# Patient Record
Sex: MALE | Race: WHITE | NOT HISPANIC OR LATINO | URBAN - METROPOLITAN AREA
[De-identification: names, ages, dates, MRNs, and addresses within clinical notes are randomized per-mention and may not be internally consistent; named-entity substitution may affect disease eponyms.]

---

## 2023-09-29 ENCOUNTER — INPATIENT (INPATIENT)
Facility: HOSPITAL | Age: 53
LOS: 0 days | Discharge: OTHER INSTITUTION | DRG: 377 | End: 2023-09-30
Attending: INTERNAL MEDICINE | Admitting: INTERNAL MEDICINE
Payer: COMMERCIAL

## 2023-09-29 VITALS
WEIGHT: 214.95 LBS | TEMPERATURE: 98 F | HEART RATE: 95 BPM | OXYGEN SATURATION: 100 % | SYSTOLIC BLOOD PRESSURE: 132 MMHG | DIASTOLIC BLOOD PRESSURE: 79 MMHG | RESPIRATION RATE: 19 BRPM

## 2023-09-29 DIAGNOSIS — K92.2 GASTROINTESTINAL HEMORRHAGE, UNSPECIFIED: ICD-10-CM

## 2023-09-29 LAB
ALBUMIN SERPL ELPH-MCNC: 3.8 G/DL — SIGNIFICANT CHANGE UP (ref 3.3–5)
ALP SERPL-CCNC: 80 U/L — SIGNIFICANT CHANGE UP (ref 40–120)
ALT FLD-CCNC: 40 U/L — SIGNIFICANT CHANGE UP (ref 12–78)
ANION GAP SERPL CALC-SCNC: 14 MMOL/L — SIGNIFICANT CHANGE UP (ref 5–17)
APTT BLD: 52.4 SEC — HIGH (ref 24.5–35.6)
AST SERPL-CCNC: 38 U/L — HIGH (ref 15–37)
BASOPHILS # BLD AUTO: 0.08 K/UL — SIGNIFICANT CHANGE UP (ref 0–0.2)
BASOPHILS NFR BLD AUTO: 0.8 % — SIGNIFICANT CHANGE UP (ref 0–2)
BILIRUB SERPL-MCNC: 1.2 MG/DL — SIGNIFICANT CHANGE UP (ref 0.2–1.2)
BUN SERPL-MCNC: 53 MG/DL — HIGH (ref 7–23)
CALCIUM SERPL-MCNC: 10 MG/DL — SIGNIFICANT CHANGE UP (ref 8.5–10.1)
CHLORIDE SERPL-SCNC: 106 MMOL/L — SIGNIFICANT CHANGE UP (ref 96–108)
CO2 SERPL-SCNC: 20 MMOL/L — LOW (ref 22–31)
CREAT SERPL-MCNC: 2 MG/DL — HIGH (ref 0.5–1.3)
EGFR: 40 ML/MIN/1.73M2 — LOW
EOSINOPHIL # BLD AUTO: 0.24 K/UL — SIGNIFICANT CHANGE UP (ref 0–0.5)
EOSINOPHIL NFR BLD AUTO: 2.3 % — SIGNIFICANT CHANGE UP (ref 0–6)
FLUAV AG NPH QL: SIGNIFICANT CHANGE UP
FLUBV AG NPH QL: SIGNIFICANT CHANGE UP
GLUCOSE SERPL-MCNC: 134 MG/DL — HIGH (ref 70–99)
HCT VFR BLD CALC: 25.7 % — LOW (ref 39–50)
HGB BLD-MCNC: 8.3 G/DL — LOW (ref 13–17)
IMM GRANULOCYTES NFR BLD AUTO: 0.6 % — SIGNIFICANT CHANGE UP (ref 0–0.9)
INR BLD: 1.45 RATIO — HIGH (ref 0.85–1.18)
LIDOCAIN IGE QN: 247 U/L — HIGH (ref 13–75)
LYMPHOCYTES # BLD AUTO: 1.75 K/UL — SIGNIFICANT CHANGE UP (ref 1–3.3)
LYMPHOCYTES # BLD AUTO: 17.1 % — SIGNIFICANT CHANGE UP (ref 13–44)
MCHC RBC-ENTMCNC: 30.7 PG — SIGNIFICANT CHANGE UP (ref 27–34)
MCHC RBC-ENTMCNC: 32.3 GM/DL — SIGNIFICANT CHANGE UP (ref 32–36)
MCV RBC AUTO: 95.2 FL — SIGNIFICANT CHANGE UP (ref 80–100)
MONOCYTES # BLD AUTO: 0.31 K/UL — SIGNIFICANT CHANGE UP (ref 0–0.9)
MONOCYTES NFR BLD AUTO: 3 % — SIGNIFICANT CHANGE UP (ref 2–14)
NEUTROPHILS # BLD AUTO: 7.78 K/UL — HIGH (ref 1.8–7.4)
NEUTROPHILS NFR BLD AUTO: 76.2 % — SIGNIFICANT CHANGE UP (ref 43–77)
NRBC # BLD: 0 /100 WBCS — SIGNIFICANT CHANGE UP (ref 0–0)
OB PNL STL: POSITIVE
PLATELET # BLD AUTO: 124 K/UL — LOW (ref 150–400)
POTASSIUM SERPL-MCNC: 4.6 MMOL/L — SIGNIFICANT CHANGE UP (ref 3.5–5.3)
POTASSIUM SERPL-SCNC: 4.6 MMOL/L — SIGNIFICANT CHANGE UP (ref 3.5–5.3)
PROT SERPL-MCNC: 7.9 G/DL — SIGNIFICANT CHANGE UP (ref 6–8.3)
PROTHROM AB SERPL-ACNC: 16.8 SEC — HIGH (ref 9.5–13)
RBC # BLD: 2.7 M/UL — LOW (ref 4.2–5.8)
RBC # FLD: 15.9 % — HIGH (ref 10.3–14.5)
RSV RNA NPH QL NAA+NON-PROBE: SIGNIFICANT CHANGE UP
SARS-COV-2 RNA SPEC QL NAA+PROBE: SIGNIFICANT CHANGE UP
SODIUM SERPL-SCNC: 140 MMOL/L — SIGNIFICANT CHANGE UP (ref 135–145)
TROPONIN I, HIGH SENSITIVITY RESULT: 11.5 NG/L — SIGNIFICANT CHANGE UP
WBC # BLD: 10.22 K/UL — SIGNIFICANT CHANGE UP (ref 3.8–10.5)
WBC # FLD AUTO: 10.22 K/UL — SIGNIFICANT CHANGE UP (ref 3.8–10.5)

## 2023-09-29 PROCEDURE — 72125 CT NECK SPINE W/O DYE: CPT | Mod: 26,MA

## 2023-09-29 PROCEDURE — 71250 CT THORAX DX C-: CPT | Mod: 26,MA

## 2023-09-29 PROCEDURE — 93010 ELECTROCARDIOGRAM REPORT: CPT

## 2023-09-29 PROCEDURE — 99285 EMERGENCY DEPT VISIT HI MDM: CPT

## 2023-09-29 PROCEDURE — 74176 CT ABD & PELVIS W/O CONTRAST: CPT | Mod: 26,MA

## 2023-09-29 PROCEDURE — 70450 CT HEAD/BRAIN W/O DYE: CPT | Mod: 26,MA

## 2023-09-29 RX ORDER — OCTREOTIDE ACETATE 200 UG/ML
50 INJECTION, SOLUTION INTRAVENOUS; SUBCUTANEOUS
Qty: 500 | Refills: 0 | Status: DISCONTINUED | OUTPATIENT
Start: 2023-09-29 | End: 2023-09-30

## 2023-09-29 RX ORDER — PANTOPRAZOLE SODIUM 20 MG/1
8 TABLET, DELAYED RELEASE ORAL
Qty: 80 | Refills: 0 | Status: DISCONTINUED | OUTPATIENT
Start: 2023-09-29 | End: 2023-09-30

## 2023-09-29 RX ORDER — PANTOPRAZOLE SODIUM 20 MG/1
40 TABLET, DELAYED RELEASE ORAL ONCE
Refills: 0 | Status: COMPLETED | OUTPATIENT
Start: 2023-09-29 | End: 2023-09-29

## 2023-09-29 RX ORDER — CHLORHEXIDINE GLUCONATE 213 G/1000ML
1 SOLUTION TOPICAL
Refills: 0 | Status: DISCONTINUED | OUTPATIENT
Start: 2023-09-29 | End: 2023-09-30

## 2023-09-29 RX ORDER — OCTREOTIDE ACETATE 200 UG/ML
50 INJECTION, SOLUTION INTRAVENOUS; SUBCUTANEOUS ONCE
Refills: 0 | Status: COMPLETED | OUTPATIENT
Start: 2023-09-29 | End: 2023-09-29

## 2023-09-29 RX ADMIN — PANTOPRAZOLE SODIUM 40 MILLIGRAM(S): 20 TABLET, DELAYED RELEASE ORAL at 18:52

## 2023-09-29 RX ADMIN — OCTREOTIDE ACETATE 10 MICROGRAM(S)/HR: 200 INJECTION, SOLUTION INTRAVENOUS; SUBCUTANEOUS at 19:18

## 2023-09-29 RX ADMIN — OCTREOTIDE ACETATE 50 MICROGRAM(S): 200 INJECTION, SOLUTION INTRAVENOUS; SUBCUTANEOUS at 19:18

## 2023-09-29 NOTE — ED PROVIDER NOTE - OBJECTIVE STATEMENT
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  Patient states he was taking a nap stood up felt lightheaded dizzy fell hitting his face and upper chest on a dresser.  Patient states he did not pass out denies of any chest pain or shortness of breath.  Patient states he has noted dark stools since yesterday.  Patient was recently admitted for bacteremia and anemia at [facility].  Patient states also having intermittent nosebleeds. 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  Patient states he was taking a nap stood up felt lightheaded dizzy fell hitting his face and upper chest on a dresser.  Patient states he did not pass out denies of any chest pain or shortness of breath.  Patient states he has noted dark stools since yesterday.  Patient was recently admitted for bacteremia and anemia at [facility].  Patient states also having intermittent nosebleeds. 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  Patient states he was taking a nap stood up felt lightheaded dizzy fell hitting his face and upper chest on a dresser.  Patient states he did not pass out denies of any chest pain or shortness of breath.  Patient states he has noted dark stools since yesterday.  Patient was recently admitted for bacteremia and anemia at [facility].  Patient states also having intermittent nosebleeds.

## 2023-09-29 NOTE — CONSULT NOTE ADULT - SUBJECTIVE AND OBJECTIVE BOX
Patient is a 52y old  Male who presents with a chief complaint of     BRIEF HOSPITAL COURSE-  53 y/o Male with PMH of ETOH Abuse, Alcoholic Cirrhosis, Renal Vein Thrombosis Barretts Esophagus presents to [facility] s/p fall at home. Reports getting of from sitting down, when suddenly got very dizzy. Admits to trying to catch his balance however fell forward hitting his head. Denies LOC or head strike. Admits to associated intermittent black bowel movements. Describes as loose, gradually increasing in frequency. States he has had previous GIB bleeding in past, s/p multiple EGDs. Reports recent 20lb weight loss. Of note, patient recent diagnosis of Alcoholic cirrhosis with hepatorenal syndrome. Underwent extensive workup and accepted for Liver transplant.       On presentation to the ED, labs significant for anemia (H/H 8.3/25.7), elevated INR (1.45), ESTRELLITA (BUN/Cr 53/2.00).       Review of Systems:  CONSTITUTIONAL: +Fatigue. No fever or chills.  EYES: No eye pain, visual disturbances, or discharge.  ENMT: No difficulty hearing, tinnitus, vertigo. No sinus or throat pain.  NECK: No pain or stiffness.  RESPIRATORY: No cough, wheezing, chills or hemoptysis. No shortness of breath.  CARDIOVASCULAR: No chest pain, palpitations, dizziness, or leg swelling.  GASTROINTESTINAL: No abdominal or epigastric pain. No nausea, vomiting, or hematemesis. No diarrhea or constipation. No melena or hematochezia.  GENITOURINARY: No dysuria, frequency, hematuria, or incontinence.  NEUROLOGICAL: No headaches, memory loss, loss of strength, numbness, or tremors.  SKIN: No itching, burning, rashes, or lesions.   MUSCULOSKELETAL: No joint pain or swelling; No muscle, back, or extremity pain.  PSYCHIATRIC: No depression, anxiety, mood swings, or difficulty sleeping.      PAST MEDICAL & SURGICAL HISTORY:        Medications:  pantoprazole Infusion 8 mG/Hr IV Continuous <Continuous>    octreotide  Infusion 50 MICROgram(s)/Hr IV Continuous <Continuous>    chlorhexidine 2% Cloths 1 Application(s) Topical <User Schedule>            ICU Vital Signs Last 24 Hrs  T(C): 36.8 (29 Sep 2023 23:28), Max: 36.8 (29 Sep 2023 23:28)  T(F): 98.2 (29 Sep 2023 23:28), Max: 98.2 (29 Sep 2023 23:28)  HR: 90 (29 Sep 2023 23:28) (90 - 95)  BP: 122/68 (29 Sep 2023 23:28) (122/68 - 132/79)  BP(mean): --  ABP: --  ABP(mean): --  RR: 18 (29 Sep 2023 23:28) (18 - 19)  SpO2: 98% (29 Sep 2023 23:28) (98% - 100%)    O2 Parameters below as of 29 Sep 2023 23:28  Patient On (Oxygen Delivery Method): room air      I&O's Detail        LABS:                        8.3    10.22 )-----------( 124      ( 29 Sep 2023 17:50 )             25.7     09-29    140  |  106  |  53<H>  ----------------------------<  134<H>  4.6   |  20<L>  |  2.00<H>    Ca    10.0      29 Sep 2023 17:50    TPro  7.9  /  Alb  3.8  /  TBili  1.2  /  DBili  x   /  AST  38<H>  /  ALT  40  /  AlkPhos  80  09-29      CAPILLARY BLOOD GLUCOSE        PT/INR - ( 29 Sep 2023 17:50 )   PT: 16.8 sec;   INR: 1.45 ratio    PTT - ( 29 Sep 2023 17:50 )  PTT:52.4 sec      Urinalysis Basic - ( 29 Sep 2023 17:50 )  Color: x / Appearance: x / SG: x / pH: x  Gluc: 134 mg/dL / Ketone: x  / Bili: x / Urobili: x   Blood: x / Protein: x / Nitrite: x   Leuk Esterase: x / RBC: x / WBC x   Sq Epi: x / Non Sq Epi: x / Bacteria: x      CULTURES:      Physical Examination:  General: Middle aged male, +Laceration on L chin. In no acute distress.    HEENT: Pupils equal, reactive to light.  Symmetric.  PULM: Clear to auscultation bilaterally, no adventitious sounds. No significant sputum production.  NECK: Supple, no lymphadenopathy, trachea midline.  CVS: Regular rate and rhythm. No murmurs, rubs, or gallops. S1, S2 intact.   ABD: Soft, mildly distended, nontender, normoactive bowel sounds. No masses palpable.   EXT: No edema, nontender.  SKIN: Warm and well perfused. Multiple petechial hemorrhages.   NEURO: Alert, oriented, interactive, nonfocal.  DEVICES:         RADIOLOGY-    < from: CT Cervical Spine No Cont (09.29.23 @ 17:33) >  ACC: [id number] EXAM:  CT CERVICAL SPINE   ORDERED BY: BARBARA ALEXANDER     ACC: [id number] EXAM:  CT BRAIN   ORDERED BY: BARBARA ALEXANDER     PROCEDURE DATE:  09/29/2023      INTERPRETATION:  CLINICAL HISTORY: Trauma. Status post fall on Coumadin.    TECHNIQUE: A noncontrast head CT and a noncontrast cervical spine CT were   performed. The head CT was performed with contiguous 5 mm transaxial   images from the skull base to vertex. Images were reviewed in brain,   stroke, subdural and bone windows.  Thecervical spine CT was performed with transaxial thin section images   from the occiput to the T2 level.  Coronal and sagittal reformatted   images were created from the transaxial source data.  Images were   reviewed in bone and soft tissue windows.    COMPARISON: None available.    FINDINGS:    Head CT:  There is no acute intracranial hemorrhage, vasogenic edema or evidence   for acute large vascular territory infarct. There is significant   parenchymal attenuation abnormality.    The ventricles, sulci and cisternal spaces are unremarkable in size and   configuration for the patient's stated age.  There is no midline shift or   abnormal extra-axial fluid collection.    The calvarium is intact.  There are no osteoblastic or lytic calvarial or  skull base lesions. Mild mucosal thickening right maxillary sinus. The   remaining paranasal sinuses and mastoid air cells are clear.    Cervical spine CT:  There is straightening of the normal cervical lordosis which could be due   to positioning and/or muscle spasm. Slight retrolisthesis of C5 in   relation to C6 is likely on a degenerative basis. There are no acute   fractures or evidence for traumatic malalignment. The vertebral body   heights are maintained. Multilevel facet alignment is preserved. The   atlanto-dental interval is within normal limits and the craniocervical   junction is unremarkable. Benign-appearing lytic lucency in the left C4   facet and sclerotic in the right C4 as facet. No aggressive osteoblastic   lesion.    There is no evidence for prevertebral soft tissue swelling or epidural   hematoma.    Spinal canal appears congenitally narrowed. There are superimposed   degenerative changes including intervertebral disc space narrowing, disc   osteophyte complexes and facet arthropathy which contribute to the   overall degree of canal stenosis, notable and likely severe at C3/C4,   C5/C6 and C6/C7.    The visualized soft tissues of the neck have an unremarkable unenhanced   appearance. The lung apices are clear.    IMPRESSION:  Head CT: No acute intracranial hemorrhage, extra-axial collection or   calvarial fracture.    Cervical spine CT: No acute cervical spine fracture or evidence of   traumatic malalignment. Cervical spondylosis superimposed on a   congenitally narrowed spinal canal.    --- End of Report ---      SEN JUÁREZ MD; Attending Radiologist  This document has been electronically signed. Sep 29 2023  6:03PM    < end of copied text >      CRITICAL CARE TIME SPENT: ** minutes assessing presenting problems of acute illness, which pose high probability of life threatening deterioration or end organ damage/dysfunction, as well as medical decision making including initiating plan of care, reviewing data, reviewing radiologic exams, discussing with multidisciplinary team,  discussing goals of care with patient/family, and writing this note.  Non-inclusive of procedures performed,   Patient is a 52y old  Male who presents with a chief complaint of     BRIEF HOSPITAL COURSE-  51 y/o Male with PMH of ETOH Abuse, Alcoholic Cirrhosis, Renal Vein Thrombosis Barretts Esophagus presents to [facility] s/p fall at home. Reports getting of from sitting down, when suddenly got very dizzy. Admits to trying to catch his balance however fell forward hitting his head. Denies LOC or head strike. Admits to associated intermittent black bowel movements. Describes as loose, gradually increasing in frequency. States he has had previous GIB bleeding in past, s/p multiple EGDs. Reports recent 20lb weight loss. Of note, patient recent diagnosis of Alcoholic cirrhosis with hepatorenal syndrome. Underwent extensive workup and accepted for Liver transplant.       On presentation to the ED, labs significant for anemia (H/H 8.3/25.7), elevated INR (1.45), ESTRELLITA (BUN/Cr 53/2.00).       Review of Systems:  CONSTITUTIONAL: +Fatigue. No fever or chills.  EYES: No eye pain, visual disturbances, or discharge.  ENMT: No difficulty hearing, tinnitus, vertigo. No sinus or throat pain.  NECK: No pain or stiffness.  RESPIRATORY: No cough, wheezing, chills or hemoptysis. No shortness of breath.  CARDIOVASCULAR: No chest pain, palpitations, dizziness, or leg swelling.  GASTROINTESTINAL: No abdominal or epigastric pain. No nausea, vomiting, or hematemesis. No diarrhea or constipation. No melena or hematochezia.  GENITOURINARY: No dysuria, frequency, hematuria, or incontinence.  NEUROLOGICAL: No headaches, memory loss, loss of strength, numbness, or tremors.  SKIN: No itching, burning, rashes, or lesions.   MUSCULOSKELETAL: No joint pain or swelling; No muscle, back, or extremity pain.  PSYCHIATRIC: No depression, anxiety, mood swings, or difficulty sleeping.      PAST MEDICAL & SURGICAL HISTORY:        Medications:  pantoprazole Infusion 8 mG/Hr IV Continuous <Continuous>    octreotide  Infusion 50 MICROgram(s)/Hr IV Continuous <Continuous>    chlorhexidine 2% Cloths 1 Application(s) Topical <User Schedule>            ICU Vital Signs Last 24 Hrs  T(C): 36.8 (29 Sep 2023 23:28), Max: 36.8 (29 Sep 2023 23:28)  T(F): 98.2 (29 Sep 2023 23:28), Max: 98.2 (29 Sep 2023 23:28)  HR: 90 (29 Sep 2023 23:28) (90 - 95)  BP: 122/68 (29 Sep 2023 23:28) (122/68 - 132/79)  BP(mean): --  ABP: --  ABP(mean): --  RR: 18 (29 Sep 2023 23:28) (18 - 19)  SpO2: 98% (29 Sep 2023 23:28) (98% - 100%)    O2 Parameters below as of 29 Sep 2023 23:28  Patient On (Oxygen Delivery Method): room air      I&O's Detail        LABS:                        8.3    10.22 )-----------( 124      ( 29 Sep 2023 17:50 )             25.7     09-29    140  |  106  |  53<H>  ----------------------------<  134<H>  4.6   |  20<L>  |  2.00<H>    Ca    10.0      29 Sep 2023 17:50    TPro  7.9  /  Alb  3.8  /  TBili  1.2  /  DBili  x   /  AST  38<H>  /  ALT  40  /  AlkPhos  80  09-29      CAPILLARY BLOOD GLUCOSE        PT/INR - ( 29 Sep 2023 17:50 )   PT: 16.8 sec;   INR: 1.45 ratio    PTT - ( 29 Sep 2023 17:50 )  PTT:52.4 sec      Urinalysis Basic - ( 29 Sep 2023 17:50 )  Color: x / Appearance: x / SG: x / pH: x  Gluc: 134 mg/dL / Ketone: x  / Bili: x / Urobili: x   Blood: x / Protein: x / Nitrite: x   Leuk Esterase: x / RBC: x / WBC x   Sq Epi: x / Non Sq Epi: x / Bacteria: x      CULTURES:      Physical Examination:  General: Middle aged male, +Laceration on L chin. In no acute distress.    HEENT: Pupils equal, reactive to light.  Symmetric.  PULM: Clear to auscultation bilaterally, no adventitious sounds. No significant sputum production.  NECK: Supple, no lymphadenopathy, trachea midline.  CVS: Regular rate and rhythm. No murmurs, rubs, or gallops. S1, S2 intact.   ABD: Soft, mildly distended, nontender, normoactive bowel sounds. No masses palpable.   EXT: No edema, nontender.  SKIN: Warm and well perfused. Multiple petechial hemorrhages.   NEURO: Alert, oriented, interactive, nonfocal.  DEVICES:         RADIOLOGY-    < from: CT Cervical Spine No Cont (09.29.23 @ 17:33) >  ACC: [id number] EXAM:  CT CERVICAL SPINE   ORDERED BY: BARBARA ALEXANDER     ACC: [id number] EXAM:  CT BRAIN   ORDERED BY: BARBARA ALEXANDER     PROCEDURE DATE:  09/29/2023      INTERPRETATION:  CLINICAL HISTORY: Trauma. Status post fall on Coumadin.    TECHNIQUE: A noncontrast head CT and a noncontrast cervical spine CT were   performed. The head CT was performed with contiguous 5 mm transaxial   images from the skull base to vertex. Images were reviewed in brain,   stroke, subdural and bone windows.  Thecervical spine CT was performed with transaxial thin section images   from the occiput to the T2 level.  Coronal and sagittal reformatted   images were created from the transaxial source data.  Images were   reviewed in bone and soft tissue windows.    COMPARISON: None available.    FINDINGS:    Head CT:  There is no acute intracranial hemorrhage, vasogenic edema or evidence   for acute large vascular territory infarct. There is significant   parenchymal attenuation abnormality.    The ventricles, sulci and cisternal spaces are unremarkable in size and   configuration for the patient's stated age.  There is no midline shift or   abnormal extra-axial fluid collection.    The calvarium is intact.  There are no osteoblastic or lytic calvarial or  skull base lesions. Mild mucosal thickening right maxillary sinus. The   remaining paranasal sinuses and mastoid air cells are clear.    Cervical spine CT:  There is straightening of the normal cervical lordosis which could be due   to positioning and/or muscle spasm. Slight retrolisthesis of C5 in   relation to C6 is likely on a degenerative basis. There are no acute   fractures or evidence for traumatic malalignment. The vertebral body   heights are maintained. Multilevel facet alignment is preserved. The   atlanto-dental interval is within normal limits and the craniocervical   junction is unremarkable. Benign-appearing lytic lucency in the left C4   facet and sclerotic in the right C4 as facet. No aggressive osteoblastic   lesion.    There is no evidence for prevertebral soft tissue swelling or epidural   hematoma.    Spinal canal appears congenitally narrowed. There are superimposed   degenerative changes including intervertebral disc space narrowing, disc   osteophyte complexes and facet arthropathy which contribute to the   overall degree of canal stenosis, notable and likely severe at C3/C4,   C5/C6 and C6/C7.    The visualized soft tissues of the neck have an unremarkable unenhanced   appearance. The lung apices are clear.    IMPRESSION:  Head CT: No acute intracranial hemorrhage, extra-axial collection or   calvarial fracture.    Cervical spine CT: No acute cervical spine fracture or evidence of   traumatic malalignment. Cervical spondylosis superimposed on a   congenitally narrowed spinal canal.    --- End of Report ---      SEN JUÁREZ MD; Attending Radiologist  This document has been electronically signed. Sep 29 2023  6:03PM    < end of copied text >      CRITICAL CARE TIME SPENT: ** minutes assessing presenting problems of acute illness, which pose high probability of life threatening deterioration or end organ damage/dysfunction, as well as medical decision making including initiating plan of care, reviewing data, reviewing radiologic exams, discussing with multidisciplinary team,  discussing goals of care with patient/family, and writing this note.  Non-inclusive of procedures performed,   Patient is a 52y old  Male who presents with a chief complaint of     BRIEF HOSPITAL COURSE-  51 y/o Male with PMH of ETOH Abuse, Alcoholic Cirrhosis, Renal Vein Thrombosis Barretts Esophagus presents to [facility] s/p fall at home. Reports getting of from sitting down, when suddenly got very dizzy. Admits to trying to catch his balance however fell forward hitting his head. Denies LOC or head strike. Admits to associated intermittent black bowel movements. Describes as loose, gradually increasing in frequency. States he has had previous GIB bleeding in past, s/p multiple EGDs. Reports recent 20lb weight loss. Of note, patient recent diagnosis of Alcoholic cirrhosis with hepatorenal syndrome. Underwent extensive workup and accepted for Liver transplant.       On presentation to the ED, labs significant for anemia (H/H 8.3/25.7), elevated INR (1.45), ESTRELLITA (BUN/Cr 53/2.00).       Review of Systems:  CONSTITUTIONAL: +Fatigue. No fever or chills.  EYES: No eye pain, visual disturbances, or discharge.  ENMT: No difficulty hearing, tinnitus, vertigo. No sinus or throat pain.  NECK: No pain or stiffness.  RESPIRATORY: No cough, wheezing, chills or hemoptysis. No shortness of breath.  CARDIOVASCULAR: No chest pain, palpitations, dizziness, or leg swelling.  GASTROINTESTINAL: No abdominal or epigastric pain. No nausea, vomiting, or hematemesis. No diarrhea or constipation. No melena or hematochezia.  GENITOURINARY: No dysuria, frequency, hematuria, or incontinence.  NEUROLOGICAL: No headaches, memory loss, loss of strength, numbness, or tremors.  SKIN: No itching, burning, rashes, or lesions.   MUSCULOSKELETAL: No joint pain or swelling; No muscle, back, or extremity pain.  PSYCHIATRIC: No depression, anxiety, mood swings, or difficulty sleeping.      PAST MEDICAL & SURGICAL HISTORY:        Medications:  pantoprazole Infusion 8 mG/Hr IV Continuous <Continuous>    octreotide  Infusion 50 MICROgram(s)/Hr IV Continuous <Continuous>    chlorhexidine 2% Cloths 1 Application(s) Topical <User Schedule>            ICU Vital Signs Last 24 Hrs  T(C): 36.8 (29 Sep 2023 23:28), Max: 36.8 (29 Sep 2023 23:28)  T(F): 98.2 (29 Sep 2023 23:28), Max: 98.2 (29 Sep 2023 23:28)  HR: 90 (29 Sep 2023 23:28) (90 - 95)  BP: 122/68 (29 Sep 2023 23:28) (122/68 - 132/79)  BP(mean): --  ABP: --  ABP(mean): --  RR: 18 (29 Sep 2023 23:28) (18 - 19)  SpO2: 98% (29 Sep 2023 23:28) (98% - 100%)    O2 Parameters below as of 29 Sep 2023 23:28  Patient On (Oxygen Delivery Method): room air      I&O's Detail        LABS:                        8.3    10.22 )-----------( 124      ( 29 Sep 2023 17:50 )             25.7     09-29    140  |  106  |  53<H>  ----------------------------<  134<H>  4.6   |  20<L>  |  2.00<H>    Ca    10.0      29 Sep 2023 17:50    TPro  7.9  /  Alb  3.8  /  TBili  1.2  /  DBili  x   /  AST  38<H>  /  ALT  40  /  AlkPhos  80  09-29      CAPILLARY BLOOD GLUCOSE        PT/INR - ( 29 Sep 2023 17:50 )   PT: 16.8 sec;   INR: 1.45 ratio    PTT - ( 29 Sep 2023 17:50 )  PTT:52.4 sec      Urinalysis Basic - ( 29 Sep 2023 17:50 )  Color: x / Appearance: x / SG: x / pH: x  Gluc: 134 mg/dL / Ketone: x  / Bili: x / Urobili: x   Blood: x / Protein: x / Nitrite: x   Leuk Esterase: x / RBC: x / WBC x   Sq Epi: x / Non Sq Epi: x / Bacteria: x      CULTURES:      Physical Examination:  General: Middle aged male, +Laceration on L chin. In no acute distress.    HEENT: Pupils equal, reactive to light.  Symmetric.  PULM: Clear to auscultation bilaterally, no adventitious sounds. No significant sputum production.  NECK: Supple, no lymphadenopathy, trachea midline.  CVS: Regular rate and rhythm. No murmurs, rubs, or gallops. S1, S2 intact.   ABD: Soft, mildly distended, nontender, normoactive bowel sounds. No masses palpable.   EXT: No edema, nontender.  SKIN: Warm and well perfused. Multiple petechial hemorrhages.   NEURO: Alert, oriented, interactive, nonfocal.  DEVICES:         RADIOLOGY-    < from: CT Cervical Spine No Cont (09.29.23 @ 17:33) >  ACC: [id number] EXAM:  CT CERVICAL SPINE   ORDERED BY: BARBARA ALEXANDER     ACC: [id number] EXAM:  CT BRAIN   ORDERED BY: BARBARA ALEXANDER     PROCEDURE DATE:  09/29/2023      INTERPRETATION:  CLINICAL HISTORY: Trauma. Status post fall on Coumadin.    TECHNIQUE: A noncontrast head CT and a noncontrast cervical spine CT were   performed. The head CT was performed with contiguous 5 mm transaxial   images from the skull base to vertex. Images were reviewed in brain,   stroke, subdural and bone windows.  Thecervical spine CT was performed with transaxial thin section images   from the occiput to the T2 level.  Coronal and sagittal reformatted   images were created from the transaxial source data.  Images were   reviewed in bone and soft tissue windows.    COMPARISON: None available.    FINDINGS:    Head CT:  There is no acute intracranial hemorrhage, vasogenic edema or evidence   for acute large vascular territory infarct. There is significant   parenchymal attenuation abnormality.    The ventricles, sulci and cisternal spaces are unremarkable in size and   configuration for the patient's stated age.  There is no midline shift or   abnormal extra-axial fluid collection.    The calvarium is intact.  There are no osteoblastic or lytic calvarial or  skull base lesions. Mild mucosal thickening right maxillary sinus. The   remaining paranasal sinuses and mastoid air cells are clear.    Cervical spine CT:  There is straightening of the normal cervical lordosis which could be due   to positioning and/or muscle spasm. Slight retrolisthesis of C5 in   relation to C6 is likely on a degenerative basis. There are no acute   fractures or evidence for traumatic malalignment. The vertebral body   heights are maintained. Multilevel facet alignment is preserved. The   atlanto-dental interval is within normal limits and the craniocervical   junction is unremarkable. Benign-appearing lytic lucency in the left C4   facet and sclerotic in the right C4 as facet. No aggressive osteoblastic   lesion.    There is no evidence for prevertebral soft tissue swelling or epidural   hematoma.    Spinal canal appears congenitally narrowed. There are superimposed   degenerative changes including intervertebral disc space narrowing, disc   osteophyte complexes and facet arthropathy which contribute to the   overall degree of canal stenosis, notable and likely severe at C3/C4,   C5/C6 and C6/C7.    The visualized soft tissues of the neck have an unremarkable unenhanced   appearance. The lung apices are clear.    IMPRESSION:  Head CT: No acute intracranial hemorrhage, extra-axial collection or   calvarial fracture.    Cervical spine CT: No acute cervical spine fracture or evidence of   traumatic malalignment. Cervical spondylosis superimposed on a   congenitally narrowed spinal canal.    --- End of Report ---      SEN JUÁREZ MD; Attending Radiologist  This document has been electronically signed. Sep 29 2023  6:03PM    < end of copied text >      CRITICAL CARE TIME SPENT: ** minutes assessing presenting problems of acute illness, which pose high probability of life threatening deterioration or end organ damage/dysfunction, as well as medical decision making including initiating plan of care, reviewing data, reviewing radiologic exams, discussing with multidisciplinary team,  discussing goals of care with patient/family, and writing this note.  Non-inclusive of procedures performed,   Patient is a 52y old  Male who presents with a chief complaint of     BRIEF HOSPITAL COURSE-  53 y/o Male with PMH of ETOH Abuse, Alcoholic Cirrhosis, Renal Vein Thrombosis, Reyna's Esophagus presents to [facility] s/p fall at home. Reports getting of from sitting down, when suddenly got very dizzy. Admits to trying to catch his balance however fell forward hitting his head. Denies LOC or head strike. Admits to associated intermittent black bowel movements. Describes as loose, gradually increasing in frequency. States he has had previous GIB bleeding in past, s/p multiple EGDs. Reports recent 20lb weight loss. Of note, patient recent diagnosis of Alcoholic cirrhosis with hepatorenal syndrome. Underwent extensive workup and accepted for Liver transplant.       On presentation to the ED, labs significant for anemia (H/H 8.3/25.7), elevated INR (1.45), ESTRELLITA (BUN/Cr 53/2.00).       Review of Systems:  CONSTITUTIONAL: +Fatigue. No fever or chills.  EYES: No eye pain, visual disturbances, or discharge.  ENMT: No difficulty hearing, tinnitus, vertigo. No sinus or throat pain.  NECK: No pain or stiffness.  RESPIRATORY: No cough, wheezing, chills or hemoptysis. No shortness of breath.  CARDIOVASCULAR: No chest pain, palpitations, dizziness, or leg swelling.  GASTROINTESTINAL: No abdominal or epigastric pain. No nausea, vomiting, or hematemesis. No diarrhea or constipation. No melena or hematochezia.  GENITOURINARY: No dysuria, frequency, hematuria, or incontinence.  NEUROLOGICAL: No headaches, memory loss, loss of strength, numbness, or tremors.  SKIN: No itching, burning, rashes, or lesions.   MUSCULOSKELETAL: No joint pain or swelling; No muscle, back, or extremity pain.  PSYCHIATRIC: No depression, anxiety, mood swings, or difficulty sleeping.      PAST MEDICAL & SURGICAL HISTORY:        Medications:  pantoprazole Infusion 8 mG/Hr IV Continuous <Continuous>    octreotide  Infusion 50 MICROgram(s)/Hr IV Continuous <Continuous>    chlorhexidine 2% Cloths 1 Application(s) Topical <User Schedule>            ICU Vital Signs Last 24 Hrs  T(C): 36.8 (29 Sep 2023 23:28), Max: 36.8 (29 Sep 2023 23:28)  T(F): 98.2 (29 Sep 2023 23:28), Max: 98.2 (29 Sep 2023 23:28)  HR: 90 (29 Sep 2023 23:28) (90 - 95)  BP: 122/68 (29 Sep 2023 23:28) (122/68 - 132/79)  BP(mean): --  ABP: --  ABP(mean): --  RR: 18 (29 Sep 2023 23:28) (18 - 19)  SpO2: 98% (29 Sep 2023 23:28) (98% - 100%)    O2 Parameters below as of 29 Sep 2023 23:28  Patient On (Oxygen Delivery Method): room air      I&O's Detail        LABS:                        8.3    10.22 )-----------( 124      ( 29 Sep 2023 17:50 )             25.7     09-29    140  |  106  |  53<H>  ----------------------------<  134<H>  4.6   |  20<L>  |  2.00<H>    Ca    10.0      29 Sep 2023 17:50    TPro  7.9  /  Alb  3.8  /  TBili  1.2  /  DBili  x   /  AST  38<H>  /  ALT  40  /  AlkPhos  80  09-29      CAPILLARY BLOOD GLUCOSE        PT/INR - ( 29 Sep 2023 17:50 )   PT: 16.8 sec;   INR: 1.45 ratio    PTT - ( 29 Sep 2023 17:50 )  PTT:52.4 sec      Urinalysis Basic - ( 29 Sep 2023 17:50 )  Color: x / Appearance: x / SG: x / pH: x  Gluc: 134 mg/dL / Ketone: x  / Bili: x / Urobili: x   Blood: x / Protein: x / Nitrite: x   Leuk Esterase: x / RBC: x / WBC x   Sq Epi: x / Non Sq Epi: x / Bacteria: x      CULTURES:      Physical Examination:  General: Middle aged male, +Laceration on L chin. In no acute distress.    HEENT: Pupils equal, reactive to light.  Symmetric.  PULM: Clear to auscultation bilaterally, no adventitious sounds. No significant sputum production.  NECK: Supple, no lymphadenopathy, trachea midline.  CVS: Regular rate and rhythm. No murmurs, rubs, or gallops. S1, S2 intact.   ABD: Soft, mildly distended, nontender, normoactive bowel sounds. No masses palpable.   EXT: No edema, nontender.  SKIN: Warm and well perfused. Multiple petechial hemorrhages.   NEURO: Alert, oriented, interactive, nonfocal.  DEVICES:         RADIOLOGY-    < from: CT Cervical Spine No Cont (09.29.23 @ 17:33) >  ACC: [id number] EXAM:  CT CERVICAL SPINE   ORDERED BY: BARBARA ALEXANDER     ACC: [id number] EXAM:  CT BRAIN   ORDERED BY: BARBARA ALEXANDER     PROCEDURE DATE:  09/29/2023      INTERPRETATION:  CLINICAL HISTORY: Trauma. Status post fall on Coumadin.    TECHNIQUE: A noncontrast head CT and a noncontrast cervical spine CT were   performed. The head CT was performed with contiguous 5 mm transaxial   images from the skull base to vertex. Images were reviewed in brain,   stroke, subdural and bone windows.  Thecervical spine CT was performed with transaxial thin section images   from the occiput to the T2 level.  Coronal and sagittal reformatted   images were created from the transaxial source data.  Images were   reviewed in bone and soft tissue windows.    COMPARISON: None available.    FINDINGS:    Head CT:  There is no acute intracranial hemorrhage, vasogenic edema or evidence   for acute large vascular territory infarct. There is significant   parenchymal attenuation abnormality.    The ventricles, sulci and cisternal spaces are unremarkable in size and   configuration for the patient's stated age.  There is no midline shift or   abnormal extra-axial fluid collection.    The calvarium is intact.  There are no osteoblastic or lytic calvarial or  skull base lesions. Mild mucosal thickening right maxillary sinus. The   remaining paranasal sinuses and mastoid air cells are clear.    Cervical spine CT:  There is straightening of the normal cervical lordosis which could be due   to positioning and/or muscle spasm. Slight retrolisthesis of C5 in   relation to C6 is likely on a degenerative basis. There are no acute   fractures or evidence for traumatic malalignment. The vertebral body   heights are maintained. Multilevel facet alignment is preserved. The   atlanto-dental interval is within normal limits and the craniocervical   junction is unremarkable. Benign-appearing lytic lucency in the left C4   facet and sclerotic in the right C4 as facet. No aggressive osteoblastic   lesion.    There is no evidence for prevertebral soft tissue swelling or epidural   hematoma.    Spinal canal appears congenitally narrowed. There are superimposed   degenerative changes including intervertebral disc space narrowing, disc   osteophyte complexes and facet arthropathy which contribute to the   overall degree of canal stenosis, notable and likely severe at C3/C4,   C5/C6 and C6/C7.    The visualized soft tissues of the neck have an unremarkable unenhanced   appearance. The lung apices are clear.    IMPRESSION:  Head CT: No acute intracranial hemorrhage, extra-axial collection or   calvarial fracture.    Cervical spine CT: No acute cervical spine fracture or evidence of   traumatic malalignment. Cervical spondylosis superimposed on a   congenitally narrowed spinal canal.    --- End of Report ---      SEN JUÁREZ MD; Attending Radiologist  This document has been electronically signed. Sep 29 2023  6:03PM    < end of copied text >        Time spent on this patient encounter, which includes documenting this note in the electronic medical record, was 75+ minutes including assessing the presenting problems with associated risks, reviewing the medical record to prepare for the encounter, and meeting face to face with the patient to obtain additional history.  I have also performed an appropriate physical exam, made interventions listed and ordered and interpreted appropriate diagnostic studies as documented.     To improve communication and patient safety, I have coordinated care with the multidisciplinary team including the bedside nurse, appropriate attending of record and consultants as needed.     Patient is a 52y old  Male who presents with a chief complaint of     BRIEF HOSPITAL COURSE-  51 y/o Male with PMH of ETOH Abuse, Alcoholic Cirrhosis, Renal Vein Thrombosis, Reyna's Esophagus presents to [facility] s/p fall at home. Reports getting of from sitting down, when suddenly got very dizzy. Admits to trying to catch his balance however fell forward hitting his head. Denies LOC or head strike. Admits to associated intermittent black bowel movements. Describes as loose, gradually increasing in frequency. States he has had previous GIB bleeding in past, s/p multiple EGDs. Reports recent 20lb weight loss. Of note, patient recent diagnosis of Alcoholic cirrhosis with hepatorenal syndrome. Underwent extensive workup and accepted for Liver transplant.       On presentation to the ED, labs significant for anemia (H/H 8.3/25.7), elevated INR (1.45), ESTRELLITA (BUN/Cr 53/2.00).       Review of Systems:  CONSTITUTIONAL: +Fatigue. No fever or chills.  EYES: No eye pain, visual disturbances, or discharge.  ENMT: No difficulty hearing, tinnitus, vertigo. No sinus or throat pain.  NECK: No pain or stiffness.  RESPIRATORY: No cough, wheezing, chills or hemoptysis. No shortness of breath.  CARDIOVASCULAR: No chest pain, palpitations, dizziness, or leg swelling.  GASTROINTESTINAL: No abdominal or epigastric pain. No nausea, vomiting, or hematemesis. No diarrhea or constipation. No melena or hematochezia.  GENITOURINARY: No dysuria, frequency, hematuria, or incontinence.  NEUROLOGICAL: No headaches, memory loss, loss of strength, numbness, or tremors.  SKIN: No itching, burning, rashes, or lesions.   MUSCULOSKELETAL: No joint pain or swelling; No muscle, back, or extremity pain.  PSYCHIATRIC: No depression, anxiety, mood swings, or difficulty sleeping.      PAST MEDICAL & SURGICAL HISTORY:        Medications:  pantoprazole Infusion 8 mG/Hr IV Continuous <Continuous>    octreotide  Infusion 50 MICROgram(s)/Hr IV Continuous <Continuous>    chlorhexidine 2% Cloths 1 Application(s) Topical <User Schedule>            ICU Vital Signs Last 24 Hrs  T(C): 36.8 (29 Sep 2023 23:28), Max: 36.8 (29 Sep 2023 23:28)  T(F): 98.2 (29 Sep 2023 23:28), Max: 98.2 (29 Sep 2023 23:28)  HR: 90 (29 Sep 2023 23:28) (90 - 95)  BP: 122/68 (29 Sep 2023 23:28) (122/68 - 132/79)  BP(mean): --  ABP: --  ABP(mean): --  RR: 18 (29 Sep 2023 23:28) (18 - 19)  SpO2: 98% (29 Sep 2023 23:28) (98% - 100%)    O2 Parameters below as of 29 Sep 2023 23:28  Patient On (Oxygen Delivery Method): room air      I&O's Detail        LABS:                        8.3    10.22 )-----------( 124      ( 29 Sep 2023 17:50 )             25.7     09-29    140  |  106  |  53<H>  ----------------------------<  134<H>  4.6   |  20<L>  |  2.00<H>    Ca    10.0      29 Sep 2023 17:50    TPro  7.9  /  Alb  3.8  /  TBili  1.2  /  DBili  x   /  AST  38<H>  /  ALT  40  /  AlkPhos  80  09-29      CAPILLARY BLOOD GLUCOSE        PT/INR - ( 29 Sep 2023 17:50 )   PT: 16.8 sec;   INR: 1.45 ratio    PTT - ( 29 Sep 2023 17:50 )  PTT:52.4 sec      Urinalysis Basic - ( 29 Sep 2023 17:50 )  Color: x / Appearance: x / SG: x / pH: x  Gluc: 134 mg/dL / Ketone: x  / Bili: x / Urobili: x   Blood: x / Protein: x / Nitrite: x   Leuk Esterase: x / RBC: x / WBC x   Sq Epi: x / Non Sq Epi: x / Bacteria: x      CULTURES:      Physical Examination:  General: Middle aged male, +Laceration on L chin. In no acute distress.    HEENT: Pupils equal, reactive to light.  Symmetric.  PULM: Clear to auscultation bilaterally, no adventitious sounds. No significant sputum production.  NECK: Supple, no lymphadenopathy, trachea midline.  CVS: Regular rate and rhythm. No murmurs, rubs, or gallops. S1, S2 intact.   ABD: Soft, mildly distended, nontender, normoactive bowel sounds. No masses palpable.   EXT: No edema, nontender.  SKIN: Warm and well perfused. Multiple petechial hemorrhages.   NEURO: Alert, oriented, interactive, nonfocal.  DEVICES:         RADIOLOGY-    < from: CT Cervical Spine No Cont (09.29.23 @ 17:33) >  ACC: [id number] EXAM:  CT CERVICAL SPINE   ORDERED BY: BARBARA ALEXANDER     ACC: [id number] EXAM:  CT BRAIN   ORDERED BY: BARBARA ALEXANDER     PROCEDURE DATE:  09/29/2023      INTERPRETATION:  CLINICAL HISTORY: Trauma. Status post fall on Coumadin.    TECHNIQUE: A noncontrast head CT and a noncontrast cervical spine CT were   performed. The head CT was performed with contiguous 5 mm transaxial   images from the skull base to vertex. Images were reviewed in brain,   stroke, subdural and bone windows.  Thecervical spine CT was performed with transaxial thin section images   from the occiput to the T2 level.  Coronal and sagittal reformatted   images were created from the transaxial source data.  Images were   reviewed in bone and soft tissue windows.    COMPARISON: None available.    FINDINGS:    Head CT:  There is no acute intracranial hemorrhage, vasogenic edema or evidence   for acute large vascular territory infarct. There is significant   parenchymal attenuation abnormality.    The ventricles, sulci and cisternal spaces are unremarkable in size and   configuration for the patient's stated age.  There is no midline shift or   abnormal extra-axial fluid collection.    The calvarium is intact.  There are no osteoblastic or lytic calvarial or  skull base lesions. Mild mucosal thickening right maxillary sinus. The   remaining paranasal sinuses and mastoid air cells are clear.    Cervical spine CT:  There is straightening of the normal cervical lordosis which could be due   to positioning and/or muscle spasm. Slight retrolisthesis of C5 in   relation to C6 is likely on a degenerative basis. There are no acute   fractures or evidence for traumatic malalignment. The vertebral body   heights are maintained. Multilevel facet alignment is preserved. The   atlanto-dental interval is within normal limits and the craniocervical   junction is unremarkable. Benign-appearing lytic lucency in the left C4   facet and sclerotic in the right C4 as facet. No aggressive osteoblastic   lesion.    There is no evidence for prevertebral soft tissue swelling or epidural   hematoma.    Spinal canal appears congenitally narrowed. There are superimposed   degenerative changes including intervertebral disc space narrowing, disc   osteophyte complexes and facet arthropathy which contribute to the   overall degree of canal stenosis, notable and likely severe at C3/C4,   C5/C6 and C6/C7.    The visualized soft tissues of the neck have an unremarkable unenhanced   appearance. The lung apices are clear.    IMPRESSION:  Head CT: No acute intracranial hemorrhage, extra-axial collection or   calvarial fracture.    Cervical spine CT: No acute cervical spine fracture or evidence of   traumatic malalignment. Cervical spondylosis superimposed on a   congenitally narrowed spinal canal.    --- End of Report ---      SEN JUÁREZ MD; Attending Radiologist  This document has been electronically signed. Sep 29 2023  6:03PM    < end of copied text >        Time spent on this patient encounter, which includes documenting this note in the electronic medical record, was 75+ minutes including assessing the presenting problems with associated risks, reviewing the medical record to prepare for the encounter, and meeting face to face with the patient to obtain additional history.  I have also performed an appropriate physical exam, made interventions listed and ordered and interpreted appropriate diagnostic studies as documented.     To improve communication and patient safety, I have coordinated care with the multidisciplinary team including the bedside nurse, appropriate attending of record and consultants as needed.     Patient is a 52y old  Male who presents with a chief complaint of     BRIEF HOSPITAL COURSE-  51 y/o Male with PMH of ETOH Abuse, Alcoholic Cirrhosis, Renal Vein Thrombosis, Reyna's Esophagus presents to [facility] s/p fall at home. Reports getting of from sitting down, when suddenly got very dizzy. Admits to trying to catch his balance however fell forward hitting his head. Denies LOC or head strike. Admits to associated intermittent black bowel movements. Describes as loose, gradually increasing in frequency. States he has had previous GIB bleeding in past, s/p multiple EGDs. Reports recent 20lb weight loss. Of note, patient recent diagnosis of Alcoholic cirrhosis with hepatorenal syndrome. Underwent extensive workup and accepted for Liver transplant.       On presentation to the ED, labs significant for anemia (H/H 8.3/25.7), elevated INR (1.45), ESTRELLITA (BUN/Cr 53/2.00).       Review of Systems:  CONSTITUTIONAL: +Fatigue. No fever or chills.  EYES: No eye pain, visual disturbances, or discharge.  ENMT: No difficulty hearing, tinnitus, vertigo. No sinus or throat pain.  NECK: No pain or stiffness.  RESPIRATORY: No cough, wheezing, chills or hemoptysis. No shortness of breath.  CARDIOVASCULAR: No chest pain, palpitations, dizziness, or leg swelling.  GASTROINTESTINAL: No abdominal or epigastric pain. No nausea, vomiting, or hematemesis. No diarrhea or constipation. No melena or hematochezia.  GENITOURINARY: No dysuria, frequency, hematuria, or incontinence.  NEUROLOGICAL: No headaches, memory loss, loss of strength, numbness, or tremors.  SKIN: No itching, burning, rashes, or lesions.   MUSCULOSKELETAL: No joint pain or swelling; No muscle, back, or extremity pain.  PSYCHIATRIC: No depression, anxiety, mood swings, or difficulty sleeping.      PAST MEDICAL & SURGICAL HISTORY:        Medications:  pantoprazole Infusion 8 mG/Hr IV Continuous <Continuous>    octreotide  Infusion 50 MICROgram(s)/Hr IV Continuous <Continuous>    chlorhexidine 2% Cloths 1 Application(s) Topical <User Schedule>            ICU Vital Signs Last 24 Hrs  T(C): 36.8 (29 Sep 2023 23:28), Max: 36.8 (29 Sep 2023 23:28)  T(F): 98.2 (29 Sep 2023 23:28), Max: 98.2 (29 Sep 2023 23:28)  HR: 90 (29 Sep 2023 23:28) (90 - 95)  BP: 122/68 (29 Sep 2023 23:28) (122/68 - 132/79)  BP(mean): --  ABP: --  ABP(mean): --  RR: 18 (29 Sep 2023 23:28) (18 - 19)  SpO2: 98% (29 Sep 2023 23:28) (98% - 100%)    O2 Parameters below as of 29 Sep 2023 23:28  Patient On (Oxygen Delivery Method): room air      I&O's Detail        LABS:                        8.3    10.22 )-----------( 124      ( 29 Sep 2023 17:50 )             25.7     09-29    140  |  106  |  53<H>  ----------------------------<  134<H>  4.6   |  20<L>  |  2.00<H>    Ca    10.0      29 Sep 2023 17:50    TPro  7.9  /  Alb  3.8  /  TBili  1.2  /  DBili  x   /  AST  38<H>  /  ALT  40  /  AlkPhos  80  09-29      CAPILLARY BLOOD GLUCOSE        PT/INR - ( 29 Sep 2023 17:50 )   PT: 16.8 sec;   INR: 1.45 ratio    PTT - ( 29 Sep 2023 17:50 )  PTT:52.4 sec      Urinalysis Basic - ( 29 Sep 2023 17:50 )  Color: x / Appearance: x / SG: x / pH: x  Gluc: 134 mg/dL / Ketone: x  / Bili: x / Urobili: x   Blood: x / Protein: x / Nitrite: x   Leuk Esterase: x / RBC: x / WBC x   Sq Epi: x / Non Sq Epi: x / Bacteria: x      CULTURES:      Physical Examination:  General: Middle aged male, +Laceration on L chin. In no acute distress.    HEENT: Pupils equal, reactive to light.  Symmetric.  PULM: Clear to auscultation bilaterally, no adventitious sounds. No significant sputum production.  NECK: Supple, no lymphadenopathy, trachea midline.  CVS: Regular rate and rhythm. No murmurs, rubs, or gallops. S1, S2 intact.   ABD: Soft, mildly distended, nontender, normoactive bowel sounds. No masses palpable.   EXT: No edema, nontender.  SKIN: Warm and well perfused. Multiple petechial hemorrhages.   NEURO: Alert, oriented, interactive, nonfocal.  DEVICES:         RADIOLOGY-    < from: CT Cervical Spine No Cont (09.29.23 @ 17:33) >  ACC: [id number] EXAM:  CT CERVICAL SPINE   ORDERED BY: BARBARA ALEXANDER     ACC: [id number] EXAM:  CT BRAIN   ORDERED BY: BARBARA ALEXANDER     PROCEDURE DATE:  09/29/2023      INTERPRETATION:  CLINICAL HISTORY: Trauma. Status post fall on Coumadin.    TECHNIQUE: A noncontrast head CT and a noncontrast cervical spine CT were   performed. The head CT was performed with contiguous 5 mm transaxial   images from the skull base to vertex. Images were reviewed in brain,   stroke, subdural and bone windows.  Thecervical spine CT was performed with transaxial thin section images   from the occiput to the T2 level.  Coronal and sagittal reformatted   images were created from the transaxial source data.  Images were   reviewed in bone and soft tissue windows.    COMPARISON: None available.    FINDINGS:    Head CT:  There is no acute intracranial hemorrhage, vasogenic edema or evidence   for acute large vascular territory infarct. There is significant   parenchymal attenuation abnormality.    The ventricles, sulci and cisternal spaces are unremarkable in size and   configuration for the patient's stated age.  There is no midline shift or   abnormal extra-axial fluid collection.    The calvarium is intact.  There are no osteoblastic or lytic calvarial or  skull base lesions. Mild mucosal thickening right maxillary sinus. The   remaining paranasal sinuses and mastoid air cells are clear.    Cervical spine CT:  There is straightening of the normal cervical lordosis which could be due   to positioning and/or muscle spasm. Slight retrolisthesis of C5 in   relation to C6 is likely on a degenerative basis. There are no acute   fractures or evidence for traumatic malalignment. The vertebral body   heights are maintained. Multilevel facet alignment is preserved. The   atlanto-dental interval is within normal limits and the craniocervical   junction is unremarkable. Benign-appearing lytic lucency in the left C4   facet and sclerotic in the right C4 as facet. No aggressive osteoblastic   lesion.    There is no evidence for prevertebral soft tissue swelling or epidural   hematoma.    Spinal canal appears congenitally narrowed. There are superimposed   degenerative changes including intervertebral disc space narrowing, disc   osteophyte complexes and facet arthropathy which contribute to the   overall degree of canal stenosis, notable and likely severe at C3/C4,   C5/C6 and C6/C7.    The visualized soft tissues of the neck have an unremarkable unenhanced   appearance. The lung apices are clear.    IMPRESSION:  Head CT: No acute intracranial hemorrhage, extra-axial collection or   calvarial fracture.    Cervical spine CT: No acute cervical spine fracture or evidence of   traumatic malalignment. Cervical spondylosis superimposed on a   congenitally narrowed spinal canal.    --- End of Report ---      SEN JUÁREZ MD; Attending Radiologist  This document has been electronically signed. Sep 29 2023  6:03PM    < end of copied text >        Time spent on this patient encounter, which includes documenting this note in the electronic medical record, was 75+ minutes including assessing the presenting problems with associated risks, reviewing the medical record to prepare for the encounter, and meeting face to face with the patient to obtain additional history.  I have also performed an appropriate physical exam, made interventions listed and ordered and interpreted appropriate diagnostic studies as documented.     To improve communication and patient safety, I have coordinated care with the multidisciplinary team including the bedside nurse, appropriate attending of record and consultants as needed.

## 2023-09-29 NOTE — ED PROVIDER NOTE - CLINICAL SUMMARY MEDICAL DECISION MAKING FREE TEXT BOX
52-year-old male with a history of chronic liver failure due to EtOH, portal venous thrombosis, multiple medical issues presents with Near syncopal episode today, fell to the ground.  Patient hit his face and his chest in his garage.  Patient complains of mild headache.  Some chest wall pain.  No other chest pain or shortness of breath.  No acute abdominal pain or abdominal trauma.  No neck pain or stiffness.  No photophobia.  No loss of consciousness with the fall.  Patient has been otherwise feeling in his usual state of health.  No cough/URI.  No other acute complaints.  Exam: Nontoxic, well-appearing.  Positive abrasions to the face, nasal abrasion.  Left anterior upper chest wall abrasion with no bony tenderness or crepitus.  CTA BL, no W/R/R.  Normal cardiac exam.  Abdomen soft, nontender, nondistended.  Normal nonfocal neurologic exam.  Full range of motion bilateral extremities x4 without pain.  No other acute findings on exam.  Acute near syncopal event today with a fall, head and chest trauma.  Patient on anticoagulation.  Will check labs, CT chest abdomen pelvis, CT head and neck, IV, reeval

## 2023-09-29 NOTE — ED PROVIDER NOTE - PROGRESS NOTE DETAILS
[facility] hepatology paged awaiting call back   pt had another dark bm in ed [facility] hepatology paged awaiting call back   pt had another dark bm in ed [facility] hepatology paged awaiting call back   pt had another dark bm in ed pt accepted by [facility] Dr. Gutierrez Hamilton [phone] awaiting bed board approval pt accepted by [facility] Dr. Gutierrez Hamilton [phone] awaiting bed board approval pt accepted by [facility] Dr. Gutierrez Hamilton [phone] awaiting bed board approval [facility] called again unsure when pt will get bed hospitalist called advised ICU  ICU consulted Discussed with ICU PA, who is seen the patient in the ER.  Patient excepted to the ICU.  Discussed with Dr. Keven Bacon, will see patient to admit.  ICU PA discussed with transfer center, they will still arrange transfer when bed available, possibly not until tomorrow.

## 2023-09-29 NOTE — ED ADULT NURSE NOTE - CCCP TRG CHIEF CMPLNT
Bed: H04  Expected date:   Expected time:   Means of arrival:   Comments:  Triage   
Pt back from XRAY without incident.   
Pt given D/C instructions and teachings. Pt is A/O x4. Finger splint applied to injured finger. Pt ambulatory towards ED lobby without incident.   
Pt to XRAY on cart. Side rails x2.   
weakness

## 2023-09-29 NOTE — CONSULT NOTE ADULT - ASSESSMENT
51 y/o Male with PMH of ETOH Abuse, Alcoholic Cirrhosis, Renal Vein Thrombosis Barretts Esophagus presents to  ED s/p fall at home with:       1. Acute Decompensated Liver Failure   2. Acute Blood Loss Anemia   3. GIB  4. ESTRELLITA          -Admit to ICU  -s/p unwitnessed fall at home. Denies LOC or head strike. CT Head and CSpine ordered  -Multiple loose melanotic bowel movements in ED. H/H stable on admission, prior hgb 8.0s per Wife. Repeat CBC @0:00. Serial CBCs. Defer chemical DVT prophylaxis and Full AC in setting of active bleed. SCDs in place.   -Keep NPO. Initiate on Protonix gtt. Given cirrhosis and likelihood varices, will initiate on Octreotide gtt. Wife uncertain at this time if patient with known varices.   -ESTRELLITA, baseline Cr ~1.4. Likely hepatorenal syndrome v congestion. Urine studies ordered. Clinically appears euvolemic. Consider Albumin + diuresis if change in fluid status/ hemodynamics. Goal MAP >75 for optimal renal perfusion.   -Hemodynamically stable. History of HTN, will hold outpatient regimen for now. Given bleeding patient high risk for decompensation requiring multiple transfusions and HD support. Monitor clinical and laboratory end points of perfusion, maintain MAP >75.  -Maintaining O2>93% on RA. Actively titrating FiO2 as tolerated. Encourage IS/ OOB.   -Patient with recent diagnosis of Alcoholic Cirrhosis, underwent extensive workup ar [facility] by transplant team. [facility] contacted, spoke to Transfer center. Plan for transfer to [facility].       Plan discussed with ED Attending, eICU Attending and ICU RN. Plan discussed with patient and family, understanding and agreeable to plan.  51 y/o Male with PMH of ETOH Abuse, Alcoholic Cirrhosis, Renal Vein Thrombosis Barretts Esophagus presents to  ED s/p fall at home with:       1. Acute Decompensated Liver Failure   2. Acute Blood Loss Anemia   3. GIB  4. ESTRELLITA          -Admit to ICU  -s/p unwitnessed fall at home. Denies LOC or head strike. CT Head and CSpine ordered  -Multiple loose melanotic bowel movements in ED. H/H stable on admission, prior hgb 8.0s per Wife. Repeat CBC @0:00. Serial CBCs. Defer chemical DVT prophylaxis and Full AC in setting of active bleed. SCDs in place.   -Keep NPO. Initiate on Protonix gtt. Given cirrhosis and likelihood varices, will initiate on Octreotide gtt. Wife uncertain at this time if patient with known varices.   -ESTRELLITA, baseline Cr ~1.4. Likely hepatorenal syndrome v congestion. Urine studies ordered. Clinically appears euvolemic. Consider Albumin + diuresis if change in fluid status/ hemodynamics. Goal MAP >75 for optimal renal perfusion.   -Hemodynamically stable. History of HTN, will hold outpatient regimen for now. Given bleeding patient high risk for decompensation requiring multiple transfusions and HD support. Monitor clinical and laboratory end points of perfusion, maintain MAP >75.  -Maintaining O2>93% on RA. Actively titrating FiO2 as tolerated. Encourage IS/ OOB.   -Patient with recent diagnosis of Alcoholic Cirrhosis, underwent extensive workup ar [facility] by transplant team. [facility] contacted, spoke to Transfer center. Plan for transfer to [facility].       Plan discussed with ED Attending, eICU Attending and ICU RN. Plan discussed with patient and family, understanding and agreeable to plan.  51 y/o Male with PMH of ETOH Abuse, Alcoholic Cirrhosis, Renal Vein Thrombosis Barretts Esophagus presents to  ED s/p fall at home with:       1. Acute Decompensated Liver Failure   2. Acute Blood Loss Anemia   3. GIB  4. ESTRELLITA          -Admit to ICU  -s/p unwitnessed fall at home. Denies LOC or head strike. CT Head and CSpine ordered  -Multiple loose melanotic bowel movements in ED. H/H stable on admission, prior hgb 8.0s per Wife. Repeat CBC @0:00. Serial CBCs. Defer chemical DVT prophylaxis and Full AC in setting of active bleed. SCDs in place.   -Keep NPO. Initiate on Protonix gtt. Given cirrhosis and likelihood varices, will initiate on Octreotide gtt. Wife uncertain at this time if patient with known varices.   -ESTRELLITA, baseline Cr ~1.4. Likely hepatorenal syndrome v congestion. Urine studies ordered. Clinically appears euvolemic. Consider Albumin + diuresis if change in fluid status/ hemodynamics. Goal MAP >75 for optimal renal perfusion.   -Hemodynamically stable. History of HTN, will hold outpatient regimen for now. Given bleeding patient high risk for decompensation requiring multiple transfusions and HD support. Monitor clinical and laboratory end points of perfusion, maintain MAP >75.  -Maintaining O2>93% on RA. Actively titrating FiO2 as tolerated. Encourage IS/ OOB.   -Patient with recent diagnosis of Alcoholic Cirrhosis, underwent extensive workup ar [facility] by transplant team. [facility] contacted, spoke to Transfer center. Plan for transfer to [facility].       Plan discussed with ED Attending, eICU Attending and ICU RN. Plan discussed with patient and family, understanding and agreeable to plan.

## 2023-09-29 NOTE — ED ADULT TRIAGE NOTE - NS ED NURSE AMBULANCES
[facility] [place] [facility] [place] [facility]

## 2023-09-29 NOTE — ED ADULT NURSE NOTE - NSFALLUNIVINTERV_ED_ALL_ED
Bed/Stretcher in lowest position, wheels locked, appropriate side rails in place/Call bell, personal items and telephone in reach/Instruct patient to call for assistance before getting out of bed/chair/stretcher/Non-slip footwear applied when patient is off stretcher/[place] to call system/Physically safe environment - no spills, clutter or unnecessary equipment/Purposeful proactive rounding/Room/bathroom lighting operational, light cord in reach Bed/Stretcher in lowest position, wheels locked, appropriate side rails in place/Call bell, personal items and telephone in reach/Instruct patient to call for assistance before getting out of bed/chair/stretcher/Non-slip footwear applied when patient is off stretcher/[place] to call system/Physically safe environment - no spills, clutter or unnecessary equipment/Purposeful proactive rounding/Room/bathroom lighting operational, light cord in reach Bed/Stretcher in lowest position, wheels locked, appropriate side rails in place/Call bell, personal items and telephone in reach/Instruct patient to call for assistance before getting out of bed/chair/stretcher/Non-slip footwear applied when patient is off stretcher/[place] to call system/Physically safe environment - no spills, clutter or unnecessary equipment/Purposeful proactive rounding/Room/bathroom lighting operational, light cord in reach

## 2023-09-29 NOTE — ED PROVIDER NOTE - CONSTITUTIONAL, MLM
normal... pale appearing, awake, alert, oriented to person, place, time/situation and in no apparent distress dried blood to nose and mouth no active bleeding abrasion to left chin

## 2023-09-29 NOTE — ED PROVIDER NOTE - NS ED ATTENDING STATEMENT MOD
This was a shared visit with the [facility]. I reviewed and verified the documentation and independently performed the documented:

## 2023-09-29 NOTE — ED ADULT NURSE NOTE - OBJECTIVE STATEMENT
received pt c/o n/v abd pain.   Pt not blood noted to mouth at this time, also with dark black/tarry stools.  Abd soft.  Pt denies dizzines at present.  Denies cp/sob/palpitations.    Skin warm and dry.   Pt safety maintained.   states he is on the lift for a liver transplant and has not had etoh for more than 2 months. BN

## 2023-09-30 VITALS
DIASTOLIC BLOOD PRESSURE: 71 MMHG | HEART RATE: 94 BPM | RESPIRATION RATE: 18 BRPM | OXYGEN SATURATION: 100 % | SYSTOLIC BLOOD PRESSURE: 131 MMHG

## 2023-09-30 PROCEDURE — 85610 PROTHROMBIN TIME: CPT

## 2023-09-30 PROCEDURE — 86850 RBC ANTIBODY SCREEN: CPT

## 2023-09-30 PROCEDURE — 87637 SARSCOV2&INF A&B&RSV AMP PRB: CPT

## 2023-09-30 PROCEDURE — 96374 THER/PROPH/DIAG INJ IV PUSH: CPT

## 2023-09-30 PROCEDURE — 85025 COMPLETE CBC W/AUTO DIFF WBC: CPT

## 2023-09-30 PROCEDURE — 99285 EMERGENCY DEPT VISIT HI MDM: CPT

## 2023-09-30 PROCEDURE — 70450 CT HEAD/BRAIN W/O DYE: CPT | Mod: MA

## 2023-09-30 PROCEDURE — 71250 CT THORAX DX C-: CPT | Mod: MA

## 2023-09-30 PROCEDURE — 84484 ASSAY OF TROPONIN QUANT: CPT

## 2023-09-30 PROCEDURE — 86900 BLOOD TYPING SEROLOGIC ABO: CPT

## 2023-09-30 PROCEDURE — 72125 CT NECK SPINE W/O DYE: CPT | Mod: MA

## 2023-09-30 PROCEDURE — 96375 TX/PRO/DX INJ NEW DRUG ADDON: CPT

## 2023-09-30 PROCEDURE — 80053 COMPREHEN METABOLIC PANEL: CPT

## 2023-09-30 PROCEDURE — 83690 ASSAY OF LIPASE: CPT

## 2023-09-30 PROCEDURE — 93005 ELECTROCARDIOGRAM TRACING: CPT

## 2023-09-30 PROCEDURE — 36415 COLL VENOUS BLD VENIPUNCTURE: CPT

## 2023-09-30 PROCEDURE — 85730 THROMBOPLASTIN TIME PARTIAL: CPT

## 2023-09-30 PROCEDURE — 82272 OCCULT BLD FECES 1-3 TESTS: CPT

## 2023-09-30 PROCEDURE — 86901 BLOOD TYPING SEROLOGIC RH(D): CPT

## 2023-09-30 PROCEDURE — 74176 CT ABD & PELVIS W/O CONTRAST: CPT | Mod: MA

## 2023-09-30 RX ORDER — FOLIC ACID 0.8 MG
1 TABLET ORAL DAILY
Refills: 0 | Status: DISCONTINUED | OUTPATIENT
Start: 2023-09-30 | End: 2023-09-30

## 2023-09-30 RX ORDER — INFLUENZA VIRUS VACCINE 15; 15; 15; 15 UG/.5ML; UG/.5ML; UG/.5ML; UG/.5ML
0.5 SUSPENSION INTRAMUSCULAR ONCE
Refills: 0 | Status: DISCONTINUED | OUTPATIENT
Start: 2023-09-30 | End: 2023-09-30

## 2023-09-30 RX ORDER — THIAMINE MONONITRATE (VIT B1) 100 MG
100 TABLET ORAL DAILY
Refills: 0 | Status: DISCONTINUED | OUTPATIENT
Start: 2023-09-30 | End: 2023-09-30

## 2023-09-30 RX ORDER — SPIRONOLACTONE 25 MG/1
100 TABLET, FILM COATED ORAL DAILY
Refills: 0 | Status: DISCONTINUED | OUTPATIENT
Start: 2023-09-30 | End: 2023-09-30

## 2023-09-30 RX ORDER — LACTULOSE 10 G/15ML
10 SOLUTION ORAL
Refills: 0 | Status: DISCONTINUED | OUTPATIENT
Start: 2023-09-30 | End: 2023-09-30

## 2023-09-30 RX ADMIN — OCTREOTIDE ACETATE 10 MICROGRAM(S)/HR: 200 INJECTION, SOLUTION INTRAVENOUS; SUBCUTANEOUS at 00:50

## 2023-09-30 RX ADMIN — PANTOPRAZOLE SODIUM 10 MG/HR: 20 TABLET, DELAYED RELEASE ORAL at 00:50

## 2023-09-30 NOTE — PATIENT PROFILE ADULT - FALL HARM RISK - HARM RISK INTERVENTIONS
Assistance with ambulation/Assistance OOB with selected safe patient handling equipment/Communicate Risk of Fall with Harm to all staff/Monitor gait and stability/Reinforce activity limits and safety measures with patient and family/Sit up slowly, dangle for a short time, stand at bedside before walking/Tailored Fall Risk Interventions/Visual Cue: Yellow wristband and red socks/Bed in lowest position, wheels locked, appropriate side rails in place/Call bell, personal items and telephone in reach/Instruct patient to call for assistance before getting out of bed or chair/Non-slip footwear when patient is out of bed/[place] to call system/Physically safe environment - no spills, clutter or unnecessary equipment/Purposeful Proactive Rounding/Room/bathroom lighting operational, light cord in reach Assistance with ambulation/Assistance OOB with selected safe patient handling equipment/Communicate Risk of Fall with Harm to all staff/Monitor gait and stability/Reinforce activity limits and safety measures with patient and family/Sit up slowly, dangle for a short time, stand at bedside before walking/Tailored Fall Risk Interventions/Visual Cue: Yellow wristband and red socks/Bed in lowest position, wheels locked, appropriate side rails in place/Call bell, personal items and telephone in reach/Instruct patient to call for assistance before getting out of bed or chair/Non-slip footwear when patient is out of bed/[place] to call system/Physically safe environment - no spills, clutter or unnecessary equipment/Purposeful Proactive Rounding/Room/bathroom lighting operational, light cord in reach Assistance with ambulation/Assistance OOB with selected safe patient handling equipment/Communicate Risk of Fall with Harm to all staff/Monitor gait and stability/Reinforce activity limits and safety measures with patient and family/Sit up slowly, dangle for a short time, stand at bedside before walking/Tailored Fall Risk Interventions/Visual Cue: Yellow wristband and red socks/Bed in lowest position, wheels locked, appropriate side rails in place/Call bell, personal items and telephone in reach/Instruct patient to call for assistance before getting out of bed or chair/Non-slip footwear when patient is out of bed/[place] to call system/Physically safe environment - no spills, clutter or unnecessary equipment/Purposeful Proactive Rounding/Room/bathroom lighting operational, light cord in reach

## 2023-09-30 NOTE — PROVIDER CONTACT NOTE (EICU) - SITUATION
53 y/o Male with PMH of ETOH Abuse, Alcoholic Cirrhosis, Renal Vein Thrombosis Barretts Esophagus presents to [facility] s/p fall at home. Reports getting of from sitting down, when suddenly got very dizzy. Admits to trying to catch his balance however fell forward hitting his head. Denies LOC or head strike. Admits to associated intermittent black bowel movements. Describes as loose, gradually increasing in frequency. States he has had previous GIB bleeding in past, s/p multiple EGDs. Reports recent 20lb weight loss. Of note, patient recent diagnosis of Alcoholic cirrhosis with hepatorenal syndrome. Underwent extensive workup and accepted for Liver transplant.   Case discussed with the ICU PA

## 2023-09-30 NOTE — PROVIDER CONTACT NOTE (EICU) - ACTION/TREATMENT ORDERED:
Octreotide drip  Protonix drip  Transfuse as needed  serial Hg  Being transferred to [facility] for transplant service which he is already registered in.

## 2023-12-21 RX ORDER — NICOTINE POLACRILEX 2 MG
1 GUM BUCCAL
Refills: 0 | DISCHARGE

## 2023-12-21 RX ORDER — THIAMINE MONONITRATE (VIT B1) 100 MG
1 TABLET ORAL
Refills: 0 | DISCHARGE

## 2023-12-21 RX ORDER — CIPROFLOXACIN LACTATE 400MG/40ML
1 VIAL (ML) INTRAVENOUS
Refills: 0 | DISCHARGE

## 2023-12-21 RX ORDER — SPIRONOLACTONE 25 MG/1
1 TABLET, FILM COATED ORAL
Refills: 0 | DISCHARGE

## 2023-12-21 RX ORDER — VENLAFAXINE HCL 75 MG
1 CAPSULE, EXT RELEASE 24 HR ORAL
Refills: 0 | DISCHARGE

## 2023-12-21 RX ORDER — WARFARIN SODIUM 2.5 MG/1
1 TABLET ORAL
Refills: 0 | DISCHARGE

## 2023-12-21 RX ORDER — PANTOPRAZOLE SODIUM 20 MG/1
1 TABLET, DELAYED RELEASE ORAL
Refills: 0 | DISCHARGE

## 2023-12-21 RX ORDER — FOLIC ACID 0.8 MG
1 TABLET ORAL
Refills: 0 | DISCHARGE

## 2023-12-21 RX ORDER — CARVEDILOL PHOSPHATE 80 MG/1
1 CAPSULE, EXTENDED RELEASE ORAL
Refills: 0 | DISCHARGE

## 2023-12-21 RX ORDER — ENOXAPARIN SODIUM 100 MG/ML
100 INJECTION SUBCUTANEOUS
Refills: 0 | DISCHARGE

## 2023-12-21 RX ORDER — LACTULOSE 10 G/15ML
15 SOLUTION ORAL
Refills: 0 | DISCHARGE

## 2023-12-21 RX ORDER — BUMETANIDE 0.25 MG/ML
1 INJECTION INTRAMUSCULAR; INTRAVENOUS
Refills: 0 | DISCHARGE

## 2025-02-07 NOTE — ED PROVIDER NOTE - ADMIT DISPOSITION PRESENT ON ADMISSION SEPSIS
Please understand that at this time there is no evidence for a more serious underlying process, but that early in the process of an illness or injury, an emergency department workup can be falsely reassuring.  You should contact your family doctor within the next 48 hours for a follow up appointment    THANK YOU!!!    From [facility] and [facility]    On behalf of the Emergency Department staff at [facility], I would like to thank you for giving us the opportunity to address your health care needs and concerns.    We hope that during your visit, our service was delivered in a professional and caring manner. Please keep [facility] in mind as we walk with you down the path to your own personal wellness.     Please expect an automated text message or email from us so we can ask a few questions about your health and progress. Based on your answers, a clinician may call you back to offer help and instructions.    Please understand that early in the process of an illness or injury, an emergency department workup can be falsely reassuring.  If you notice any worsening, changing or persistent symptoms please call your family doctor or return to the ER immediately.     Tell us how we did during your visit at [email or web address]   and let us know about your experience   
No